# Patient Record
Sex: FEMALE | Race: WHITE | NOT HISPANIC OR LATINO | Employment: OTHER | URBAN - METROPOLITAN AREA
[De-identification: names, ages, dates, MRNs, and addresses within clinical notes are randomized per-mention and may not be internally consistent; named-entity substitution may affect disease eponyms.]

---

## 2017-02-01 ENCOUNTER — PREPPED CHART (OUTPATIENT)
Dept: URBAN - METROPOLITAN AREA CLINIC 35 | Facility: CLINIC | Age: 57
End: 2017-02-01

## 2017-11-06 ASSESSMENT — TONOMETRY
OS_IOP_MMHG: 24
OD_IOP_MMHG: 39

## 2017-11-14 ENCOUNTER — IOP CHECK (OUTPATIENT)
Dept: URBAN - METROPOLITAN AREA CLINIC 35 | Facility: CLINIC | Age: 57
End: 2017-11-14

## 2017-11-14 DIAGNOSIS — H40.053: ICD-10-CM

## 2017-11-14 DIAGNOSIS — H40.023: ICD-10-CM

## 2017-11-14 DIAGNOSIS — H04.123: ICD-10-CM

## 2017-11-14 PROCEDURE — 92012 INTRM OPH EXAM EST PATIENT: CPT

## 2017-11-14 RX ORDER — BIMATOPROST 0.1 MG/ML: 1 SOLUTION/ DROPS OPHTHALMIC EVERY EVENING

## 2017-11-14 ASSESSMENT — TONOMETRY
OS_IOP_MMHG: 16
OD_IOP_MMHG: 30

## 2017-11-14 ASSESSMENT — VISUAL ACUITY
OS_SC: 20/25-1
OD_SC: 20/40-2

## 2018-09-25 ENCOUNTER — IOP CHECK (OUTPATIENT)
Dept: URBAN - METROPOLITAN AREA CLINIC 35 | Facility: CLINIC | Age: 58
End: 2018-09-25

## 2018-09-25 DIAGNOSIS — H40.023: ICD-10-CM

## 2018-09-25 DIAGNOSIS — H04.123: ICD-10-CM

## 2018-09-25 DIAGNOSIS — H47.322: ICD-10-CM

## 2018-09-25 DIAGNOSIS — H40.053: ICD-10-CM

## 2018-09-25 PROCEDURE — 92012 INTRM OPH EXAM EST PATIENT: CPT

## 2018-09-25 ASSESSMENT — TONOMETRY
OS_IOP_MMHG: 16
OD_IOP_MMHG: 24

## 2018-09-25 ASSESSMENT — VISUAL ACUITY
OD_CC: 20/25
OS_CC: 20/20-2

## 2019-02-12 ENCOUNTER — ESTABLISHED COMPREHENSIVE EXAM (OUTPATIENT)
Dept: URBAN - METROPOLITAN AREA CLINIC 35 | Facility: CLINIC | Age: 59
End: 2019-02-12

## 2019-02-12 DIAGNOSIS — H18.59: ICD-10-CM

## 2019-02-12 DIAGNOSIS — H40.053: ICD-10-CM

## 2019-02-12 DIAGNOSIS — H52.11: ICD-10-CM

## 2019-02-12 DIAGNOSIS — H04.123: ICD-10-CM

## 2019-02-12 DIAGNOSIS — H40.023: ICD-10-CM

## 2019-02-12 DIAGNOSIS — H47.322: ICD-10-CM

## 2019-02-12 PROCEDURE — 92133 CPTRZD OPH DX IMG PST SGM ON: CPT

## 2019-02-12 PROCEDURE — 92015 DETERMINE REFRACTIVE STATE: CPT

## 2019-02-12 PROCEDURE — 92014CFS ESTABLISHED PT, EMPLOYEE ROUTINE COMP EXAM

## 2019-02-12 ASSESSMENT — TONOMETRY
OD_IOP_MMHG: 24
OS_IOP_MMHG: 18

## 2019-02-12 ASSESSMENT — KERATOMETRY
OS_K2POWER_DIOPTERS: 46.25
OS_K1POWER_DIOPTERS: 45.5
OD_K1POWER_DIOPTERS: 47.25
OD_K2POWER_DIOPTERS: 46.5

## 2019-02-12 ASSESSMENT — VISUAL ACUITY
OS_CC: J1
OD_SC: 20/20
OS_SC: 20/20
OD_CC: J1

## 2019-03-08 ENCOUNTER — TECH ONLY (OUTPATIENT)
Dept: URBAN - METROPOLITAN AREA CLINIC 35 | Facility: CLINIC | Age: 59
End: 2019-03-08

## 2019-03-08 DIAGNOSIS — H40.053: ICD-10-CM

## 2019-03-08 PROCEDURE — 92083 EXTENDED VISUAL FIELD XM: CPT

## 2019-03-08 PROCEDURE — 99211T TECH SERVICE

## 2019-03-08 ASSESSMENT — KERATOMETRY
OD_K1POWER_DIOPTERS: 47.25
OD_K2POWER_DIOPTERS: 46.5
OS_K2POWER_DIOPTERS: 46.25
OS_K1POWER_DIOPTERS: 45.5

## 2019-03-19 ENCOUNTER — IOP CHECK (OUTPATIENT)
Dept: URBAN - METROPOLITAN AREA CLINIC 35 | Facility: CLINIC | Age: 59
End: 2019-03-19

## 2019-03-19 DIAGNOSIS — H47.322: ICD-10-CM

## 2019-03-19 DIAGNOSIS — H40.1121: ICD-10-CM

## 2019-03-19 DIAGNOSIS — H40.1112: ICD-10-CM

## 2019-03-19 DIAGNOSIS — H40.053: ICD-10-CM

## 2019-03-19 PROCEDURE — 92012 INTRM OPH EXAM EST PATIENT: CPT

## 2019-03-19 ASSESSMENT — KERATOMETRY
OD_K2POWER_DIOPTERS: 46.5
OS_K1POWER_DIOPTERS: 45.5
OS_K2POWER_DIOPTERS: 46.25
OD_K1POWER_DIOPTERS: 47.25

## 2019-03-19 ASSESSMENT — VISUAL ACUITY
OD_CC: 20/25-2
OS_CC: 20/20

## 2019-03-19 ASSESSMENT — TONOMETRY
OD_IOP_MMHG: 20
OS_IOP_MMHG: 18
OS_IOP_MMHG: 14
OD_IOP_MMHG: 25

## 2019-03-26 ENCOUNTER — TECH ONLY (OUTPATIENT)
Dept: URBAN - METROPOLITAN AREA CLINIC 35 | Facility: CLINIC | Age: 59
End: 2019-03-26

## 2019-03-26 DIAGNOSIS — H40.053: ICD-10-CM

## 2019-03-26 DIAGNOSIS — H04.123: ICD-10-CM

## 2019-03-26 DIAGNOSIS — H47.322: ICD-10-CM

## 2019-03-26 DIAGNOSIS — H18.59: ICD-10-CM

## 2019-03-26 DIAGNOSIS — H40.1121: ICD-10-CM

## 2019-03-26 DIAGNOSIS — H40.1112: ICD-10-CM

## 2019-03-26 PROCEDURE — 92083 EXTENDED VISUAL FIELD XM: CPT

## 2019-03-26 PROCEDURE — 99211T TECH SERVICE

## 2019-03-26 ASSESSMENT — KERATOMETRY
OS_K1POWER_DIOPTERS: 45.5
OS_K2POWER_DIOPTERS: 46.25
OD_K2POWER_DIOPTERS: 46.5
OD_K1POWER_DIOPTERS: 47.25

## 2019-03-27 ENCOUNTER — CONSULT (OUTPATIENT)
Dept: URBAN - METROPOLITAN AREA CLINIC 39 | Facility: CLINIC | Age: 59
End: 2019-03-27

## 2019-03-27 DIAGNOSIS — H40.1121: ICD-10-CM

## 2019-03-27 DIAGNOSIS — H47.322: ICD-10-CM

## 2019-03-27 DIAGNOSIS — H40.053: ICD-10-CM

## 2019-03-27 DIAGNOSIS — H40.1112: ICD-10-CM

## 2019-03-27 PROCEDURE — 76514 ECHO EXAM OF EYE THICKNESS: CPT

## 2019-03-27 PROCEDURE — 92225 OPHTHALMOSCOPY (INITIAL): CPT

## 2019-03-27 PROCEDURE — 92250 FUNDUS PHOTOGRAPHY W/I&R: CPT

## 2019-03-27 PROCEDURE — 92014 COMPRE OPH EXAM EST PT 1/>: CPT

## 2019-03-27 ASSESSMENT — KERATOMETRY
OS_K2POWER_DIOPTERS: 46.25
OD_K1POWER_DIOPTERS: 47.25
OD_K2POWER_DIOPTERS: 46.5
OS_K1POWER_DIOPTERS: 45.5

## 2019-03-27 ASSESSMENT — PACHYMETRY
OS_CT_UM: 651
OD_CT_UM: 625

## 2019-03-27 ASSESSMENT — TONOMETRY
OD_IOP_MMHG: 21
OS_IOP_MMHG: 15

## 2019-03-27 ASSESSMENT — VISUAL ACUITY
OD_CC: 20/20-2
OS_CC: 20/20-1
OD_CC: J1
OS_CC: J2

## 2019-04-23 ENCOUNTER — IOP CHECK (OUTPATIENT)
Dept: URBAN - METROPOLITAN AREA CLINIC 35 | Facility: CLINIC | Age: 59
End: 2019-04-23

## 2019-04-23 DIAGNOSIS — H47.322: ICD-10-CM

## 2019-04-23 DIAGNOSIS — H40.1112: ICD-10-CM

## 2019-04-23 DIAGNOSIS — H40.1121: ICD-10-CM

## 2019-04-23 DIAGNOSIS — H40.053: ICD-10-CM

## 2019-04-23 PROCEDURE — 92012 INTRM OPH EXAM EST PATIENT: CPT

## 2019-04-23 ASSESSMENT — VISUAL ACUITY
OD_CC: 20/25
OS_CC: 20/20-1

## 2019-04-23 ASSESSMENT — TONOMETRY
OD_IOP_MMHG: 18
OS_IOP_MMHG: 14

## 2019-06-19 ENCOUNTER — CLINIC PROCEDURE ONLY (OUTPATIENT)
Dept: URBAN - METROPOLITAN AREA CLINIC 39 | Facility: CLINIC | Age: 59
End: 2019-06-19

## 2019-06-19 DIAGNOSIS — H40.1112: ICD-10-CM

## 2019-06-19 DIAGNOSIS — H40.1121: ICD-10-CM

## 2019-06-19 PROCEDURE — 92012 INTRM OPH EXAM EST PATIENT: CPT

## 2019-06-19 PROCEDURE — 65855 TRABECULOPLASTY LASER SURG: CPT

## 2019-06-19 ASSESSMENT — TONOMETRY
OD_IOP_MMHG: 22
OS_IOP_MMHG: 15

## 2019-06-19 ASSESSMENT — KERATOMETRY
OD_K2POWER_DIOPTERS: 46.5
OS_K2POWER_DIOPTERS: 46.25
OD_K1POWER_DIOPTERS: 47.25
OS_K1POWER_DIOPTERS: 45.5

## 2019-06-19 ASSESSMENT — VISUAL ACUITY
OD_CC: 20/30+2
OS_CC: 20/20-1

## 2019-07-30 ENCOUNTER — IOP CHECK (OUTPATIENT)
Dept: URBAN - METROPOLITAN AREA CLINIC 35 | Facility: CLINIC | Age: 59
End: 2019-07-30

## 2019-07-30 DIAGNOSIS — H40.053: ICD-10-CM

## 2019-07-30 DIAGNOSIS — H40.1121: ICD-10-CM

## 2019-07-30 DIAGNOSIS — H47.322: ICD-10-CM

## 2019-07-30 DIAGNOSIS — H40.1112: ICD-10-CM

## 2019-07-30 PROCEDURE — 92012 INTRM OPH EXAM EST PATIENT: CPT

## 2019-07-30 ASSESSMENT — VISUAL ACUITY
OS_CC: 20/20-1
OD_CC: 20/25-1

## 2019-07-30 ASSESSMENT — TONOMETRY
OS_IOP_MMHG: 14
OD_IOP_MMHG: 20

## 2019-08-27 ENCOUNTER — IOP CHECK (OUTPATIENT)
Dept: URBAN - METROPOLITAN AREA CLINIC 35 | Facility: CLINIC | Age: 59
End: 2019-08-27

## 2019-08-27 DIAGNOSIS — H40.1121: ICD-10-CM

## 2019-08-27 DIAGNOSIS — H40.053: ICD-10-CM

## 2019-08-27 DIAGNOSIS — H47.322: ICD-10-CM

## 2019-08-27 DIAGNOSIS — H40.1112: ICD-10-CM

## 2019-08-27 PROCEDURE — 92012 INTRM OPH EXAM EST PATIENT: CPT

## 2019-08-27 ASSESSMENT — KERATOMETRY
OS_K1POWER_DIOPTERS: 45.5
OD_K1POWER_DIOPTERS: 47.25
OD_K2POWER_DIOPTERS: 46.5
OS_K2POWER_DIOPTERS: 46.25

## 2019-08-27 ASSESSMENT — VISUAL ACUITY
OD_CC: 20/25-2
OS_CC: 20/25+1

## 2019-08-27 ASSESSMENT — TONOMETRY
OD_IOP_MMHG: 23
OS_IOP_MMHG: 18

## 2019-12-17 ENCOUNTER — IOP CHECK (OUTPATIENT)
Dept: URBAN - METROPOLITAN AREA CLINIC 35 | Facility: CLINIC | Age: 59
End: 2019-12-17

## 2019-12-17 DIAGNOSIS — H35.372: ICD-10-CM

## 2019-12-17 DIAGNOSIS — H40.1112: ICD-10-CM

## 2019-12-17 DIAGNOSIS — H40.1121: ICD-10-CM

## 2019-12-17 PROCEDURE — 92012 INTRM OPH EXAM EST PATIENT: CPT

## 2019-12-17 PROCEDURE — 92133 CPTRZD OPH DX IMG PST SGM ON: CPT

## 2019-12-17 ASSESSMENT — KERATOMETRY
OD_K2POWER_DIOPTERS: 46.5
OS_K2POWER_DIOPTERS: 46.25
OS_K1POWER_DIOPTERS: 45.5
OD_K1POWER_DIOPTERS: 47.25

## 2019-12-17 ASSESSMENT — VISUAL ACUITY
OS_CC: 20/20-2
OD_CC: 20/25

## 2019-12-18 ASSESSMENT — TONOMETRY
OS_IOP_MMHG: 14
OS_IOP_MMHG: 17
OD_IOP_MMHG: 20

## 2020-01-21 ENCOUNTER — IOP CHECK (OUTPATIENT)
Dept: URBAN - METROPOLITAN AREA CLINIC 35 | Facility: CLINIC | Age: 60
End: 2020-01-21

## 2020-01-21 DIAGNOSIS — H40.1112: ICD-10-CM

## 2020-01-21 DIAGNOSIS — H40.1121: ICD-10-CM

## 2020-01-21 DIAGNOSIS — H35.372: ICD-10-CM

## 2020-01-21 PROCEDURE — 92012 INTRM OPH EXAM EST PATIENT: CPT

## 2020-01-21 ASSESSMENT — KERATOMETRY
OS_K2POWER_DIOPTERS: 46.25
OD_K2POWER_DIOPTERS: 46.5
OS_K1POWER_DIOPTERS: 45.5
OD_K1POWER_DIOPTERS: 47.25

## 2020-01-21 ASSESSMENT — TONOMETRY
OD_IOP_MMHG: 19
OS_IOP_MMHG: 14

## 2020-01-21 ASSESSMENT — VISUAL ACUITY
OS_CC: 20/20-2
OD_CC: 20/25

## 2020-03-13 ENCOUNTER — VISUAL FIELD ONLY (OUTPATIENT)
Dept: URBAN - METROPOLITAN AREA CLINIC 35 | Facility: CLINIC | Age: 60
End: 2020-03-13

## 2020-03-13 DIAGNOSIS — H35.372: ICD-10-CM

## 2020-03-13 DIAGNOSIS — H40.1121: ICD-10-CM

## 2020-03-13 DIAGNOSIS — H40.1112: ICD-10-CM

## 2020-03-13 PROCEDURE — 92083 EXTENDED VISUAL FIELD XM: CPT

## 2020-03-13 PROCEDURE — 99211T TECH SERVICE

## 2020-04-20 ENCOUNTER — IOP CHECK (OUTPATIENT)
Dept: URBAN - METROPOLITAN AREA CLINIC 35 | Facility: CLINIC | Age: 60
End: 2020-04-20

## 2020-04-20 DIAGNOSIS — H40.1112: ICD-10-CM

## 2020-04-20 DIAGNOSIS — H40.1121: ICD-10-CM

## 2020-04-20 DIAGNOSIS — H35.372: ICD-10-CM

## 2020-04-20 PROCEDURE — 92012 INTRM OPH EXAM EST PATIENT: CPT

## 2020-04-20 ASSESSMENT — TONOMETRY
OD_IOP_MMHG: 21
OS_IOP_MMHG: 14
OS_IOP_MMHG: 16
OD_IOP_MMHG: 22

## 2020-04-20 ASSESSMENT — VISUAL ACUITY
OD_CC: 20/25-1
OS_CC: 20/20-2

## 2020-05-19 ENCOUNTER — IOP CHECK (OUTPATIENT)
Dept: URBAN - METROPOLITAN AREA CLINIC 35 | Facility: CLINIC | Age: 60
End: 2020-05-19

## 2020-05-19 DIAGNOSIS — H40.1121: ICD-10-CM

## 2020-05-19 DIAGNOSIS — H40.1112: ICD-10-CM

## 2020-05-19 PROCEDURE — 92012 INTRM OPH EXAM EST PATIENT: CPT

## 2020-05-19 ASSESSMENT — VISUAL ACUITY
OD_CC: 20/30
OS_CC: 20/25

## 2020-05-19 ASSESSMENT — TONOMETRY
OS_IOP_MMHG: 16
OD_IOP_MMHG: 19

## 2020-08-19 ENCOUNTER — ESTABLISHED COMPREHENSIVE EXAM (OUTPATIENT)
Dept: URBAN - METROPOLITAN AREA CLINIC 35 | Facility: CLINIC | Age: 60
End: 2020-08-19

## 2020-08-19 DIAGNOSIS — H04.123: ICD-10-CM

## 2020-08-19 DIAGNOSIS — H40.1112: ICD-10-CM

## 2020-08-19 DIAGNOSIS — H47.322: ICD-10-CM

## 2020-08-19 DIAGNOSIS — H35.372: ICD-10-CM

## 2020-08-19 DIAGNOSIS — H40.1121: ICD-10-CM

## 2020-08-19 PROCEDURE — 92014 COMPRE OPH EXAM EST PT 1/>: CPT

## 2020-08-19 ASSESSMENT — KERATOMETRY
OD_K1POWER_DIOPTERS: 47.25
OS_K1POWER_DIOPTERS: 45.5
OD_K2POWER_DIOPTERS: 46.5
OS_K2POWER_DIOPTERS: 46.25

## 2020-08-19 ASSESSMENT — VISUAL ACUITY
OD_SC: 20/80
OD_CC: J1
OU_SC: 20/25
OD_CC: 20/25-2
OS_SC: 20/25+2
OS_CC: J1
OS_CC: 20/20

## 2020-08-19 ASSESSMENT — TONOMETRY
OS_IOP_MMHG: 18
OD_IOP_MMHG: 20
OD_IOP_MMHG: 18
OS_IOP_MMHG: 15

## 2020-09-24 ENCOUNTER — IOP CHECK (OUTPATIENT)
Dept: URBAN - METROPOLITAN AREA CLINIC 35 | Facility: CLINIC | Age: 60
End: 2020-09-24

## 2020-09-24 DIAGNOSIS — H35.372: ICD-10-CM

## 2020-09-24 DIAGNOSIS — H40.1121: ICD-10-CM

## 2020-09-24 DIAGNOSIS — H40.1112: ICD-10-CM

## 2020-09-24 PROCEDURE — 99212 OFFICE O/P EST SF 10 MIN: CPT

## 2020-09-24 ASSESSMENT — VISUAL ACUITY
OS_CC: 20/20-2
OD_CC: 20/20-2

## 2020-09-24 ASSESSMENT — TONOMETRY
OS_IOP_MMHG: 18
OD_IOP_MMHG: 20

## 2020-11-06 ENCOUNTER — TECH ONLY (OUTPATIENT)
Dept: URBAN - METROPOLITAN AREA CLINIC 35 | Facility: CLINIC | Age: 60
End: 2020-11-06

## 2020-11-06 DIAGNOSIS — H40.1112: ICD-10-CM

## 2020-11-06 DIAGNOSIS — H40.1121: ICD-10-CM

## 2020-11-06 PROCEDURE — 99211T TECH SERVICE

## 2020-11-06 PROCEDURE — 92083 EXTENDED VISUAL FIELD XM: CPT

## 2020-12-08 ENCOUNTER — IOP CHECK (OUTPATIENT)
Dept: URBAN - METROPOLITAN AREA CLINIC 35 | Facility: CLINIC | Age: 60
End: 2020-12-08

## 2020-12-08 DIAGNOSIS — H40.1112: ICD-10-CM

## 2020-12-08 DIAGNOSIS — H40.1121: ICD-10-CM

## 2020-12-08 PROCEDURE — 99211 OFF/OP EST MAY X REQ PHY/QHP: CPT

## 2020-12-08 RX ORDER — BRIMONIDINE TARTRATE 1 MG/ML
1 SOLUTION/ DROPS OPHTHALMIC TWICE A DAY
Start: 2020-12-08

## 2020-12-08 ASSESSMENT — TONOMETRY
OD_IOP_MMHG: 24
OS_IOP_MMHG: 16

## 2021-06-28 NOTE — PATIENT DISCUSSION
HYDROXYCHLOROQUINE (PLAQUENIL) USE:  NO OCULAR TOXICITY OU. CONTINUE PLAQUENIL AS DIRECTED. MAC OCT DONE TO DOCUMENT. COLOR PLATES DONE TO DOCUMENT COLOR VISION. SCHEDULE YEARLY [id number]. PATIENT INSTRUCTED TO RETURN TO PCP.